# Patient Record
Sex: MALE | Race: WHITE | NOT HISPANIC OR LATINO | URBAN - METROPOLITAN AREA
[De-identification: names, ages, dates, MRNs, and addresses within clinical notes are randomized per-mention and may not be internally consistent; named-entity substitution may affect disease eponyms.]

---

## 2018-03-14 ENCOUNTER — EMERGENCY (EMERGENCY)
Facility: HOSPITAL | Age: 53
LOS: 1 days | Discharge: HOME | End: 2018-03-14
Attending: EMERGENCY MEDICINE | Admitting: EMERGENCY MEDICINE
Payer: COMMERCIAL

## 2018-03-14 VITALS
SYSTOLIC BLOOD PRESSURE: 131 MMHG | RESPIRATION RATE: 18 BRPM | DIASTOLIC BLOOD PRESSURE: 83 MMHG | OXYGEN SATURATION: 98 % | HEART RATE: 67 BPM | TEMPERATURE: 98 F

## 2018-03-14 VITALS
HEART RATE: 56 BPM | OXYGEN SATURATION: 96 % | RESPIRATION RATE: 18 BRPM | DIASTOLIC BLOOD PRESSURE: 69 MMHG | SYSTOLIC BLOOD PRESSURE: 108 MMHG

## 2018-03-14 LAB
ALBUMIN SERPL ELPH-MCNC: 4.9 G/DL — SIGNIFICANT CHANGE UP (ref 3.3–5)
ALP SERPL-CCNC: 58 U/L — SIGNIFICANT CHANGE UP (ref 40–120)
ALT FLD-CCNC: 23 U/L RC — SIGNIFICANT CHANGE UP (ref 10–45)
ANION GAP SERPL CALC-SCNC: 13 MMOL/L — SIGNIFICANT CHANGE UP (ref 5–17)
APTT BLD: 34.7 SEC — SIGNIFICANT CHANGE UP (ref 27.5–37.4)
AST SERPL-CCNC: 20 U/L — SIGNIFICANT CHANGE UP (ref 10–40)
BASE EXCESS BLDV CALC-SCNC: 3.3 MMOL/L — HIGH (ref -2–2)
BASOPHILS # BLD AUTO: 0 K/UL — SIGNIFICANT CHANGE UP (ref 0–0.2)
BASOPHILS NFR BLD AUTO: 0.3 % — SIGNIFICANT CHANGE UP (ref 0–2)
BILIRUB SERPL-MCNC: 0.8 MG/DL — SIGNIFICANT CHANGE UP (ref 0.2–1.2)
BUN SERPL-MCNC: 20 MG/DL — SIGNIFICANT CHANGE UP (ref 7–23)
CA-I SERPL-SCNC: 1.29 MMOL/L — SIGNIFICANT CHANGE UP (ref 1.12–1.3)
CALCIUM SERPL-MCNC: 10.4 MG/DL — SIGNIFICANT CHANGE UP (ref 8.4–10.5)
CHLORIDE BLDV-SCNC: 99 MMOL/L — SIGNIFICANT CHANGE UP (ref 96–108)
CHLORIDE SERPL-SCNC: 98 MMOL/L — SIGNIFICANT CHANGE UP (ref 96–108)
CK MB BLD-MCNC: 1.4 % — SIGNIFICANT CHANGE UP (ref 0–3.5)
CK MB CFR SERPL CALC: 2.2 NG/ML — SIGNIFICANT CHANGE UP (ref 0–6.7)
CK SERPL-CCNC: 160 U/L — SIGNIFICANT CHANGE UP (ref 30–200)
CO2 BLDV-SCNC: 31 MMOL/L — HIGH (ref 22–30)
CO2 SERPL-SCNC: 26 MMOL/L — SIGNIFICANT CHANGE UP (ref 22–31)
CREAT SERPL-MCNC: 0.98 MG/DL — SIGNIFICANT CHANGE UP (ref 0.5–1.3)
EOSINOPHIL # BLD AUTO: 0 K/UL — SIGNIFICANT CHANGE UP (ref 0–0.5)
EOSINOPHIL NFR BLD AUTO: 0.3 % — SIGNIFICANT CHANGE UP (ref 0–6)
GAS PNL BLDV: 138 MMOL/L — SIGNIFICANT CHANGE UP (ref 136–145)
GAS PNL BLDV: SIGNIFICANT CHANGE UP
GLUCOSE BLDV-MCNC: 94 MG/DL — SIGNIFICANT CHANGE UP (ref 70–99)
GLUCOSE SERPL-MCNC: 98 MG/DL — SIGNIFICANT CHANGE UP (ref 70–99)
HCO3 BLDV-SCNC: 29 MMOL/L — SIGNIFICANT CHANGE UP (ref 21–29)
HCT VFR BLD CALC: 45.3 % — SIGNIFICANT CHANGE UP (ref 39–50)
HCT VFR BLDA CALC: 50 % — SIGNIFICANT CHANGE UP (ref 39–50)
HGB BLD CALC-MCNC: 16.4 G/DL — SIGNIFICANT CHANGE UP (ref 13–17)
HGB BLD-MCNC: 16.3 G/DL — SIGNIFICANT CHANGE UP (ref 13–17)
INR BLD: 1.06 RATIO — SIGNIFICANT CHANGE UP (ref 0.88–1.16)
LACTATE BLDV-MCNC: 1.2 MMOL/L — SIGNIFICANT CHANGE UP (ref 0.7–2)
LIDOCAIN IGE QN: 20 U/L — SIGNIFICANT CHANGE UP (ref 7–60)
LYMPHOCYTES # BLD AUTO: 1.9 K/UL — SIGNIFICANT CHANGE UP (ref 1–3.3)
LYMPHOCYTES # BLD AUTO: 14.9 % — SIGNIFICANT CHANGE UP (ref 13–44)
MCHC RBC-ENTMCNC: 33.8 PG — SIGNIFICANT CHANGE UP (ref 27–34)
MCHC RBC-ENTMCNC: 35.9 GM/DL — SIGNIFICANT CHANGE UP (ref 32–36)
MCV RBC AUTO: 94.3 FL — SIGNIFICANT CHANGE UP (ref 80–100)
MONOCYTES # BLD AUTO: 0.6 K/UL — SIGNIFICANT CHANGE UP (ref 0–0.9)
MONOCYTES NFR BLD AUTO: 5 % — SIGNIFICANT CHANGE UP (ref 2–14)
NEUTROPHILS # BLD AUTO: 10.2 K/UL — HIGH (ref 1.8–7.4)
NEUTROPHILS NFR BLD AUTO: 79.6 % — HIGH (ref 43–77)
PCO2 BLDV: 51 MMHG — HIGH (ref 35–50)
PH BLDV: 7.38 — SIGNIFICANT CHANGE UP (ref 7.35–7.45)
PLATELET # BLD AUTO: 235 K/UL — SIGNIFICANT CHANGE UP (ref 150–400)
PO2 BLDV: 32 MMHG — SIGNIFICANT CHANGE UP (ref 25–45)
POTASSIUM BLDV-SCNC: 3.8 MMOL/L — SIGNIFICANT CHANGE UP (ref 3.5–5)
POTASSIUM SERPL-MCNC: 4.1 MMOL/L — SIGNIFICANT CHANGE UP (ref 3.5–5.3)
POTASSIUM SERPL-SCNC: 4.1 MMOL/L — SIGNIFICANT CHANGE UP (ref 3.5–5.3)
PROT SERPL-MCNC: 8 G/DL — SIGNIFICANT CHANGE UP (ref 6–8.3)
PROTHROM AB SERPL-ACNC: 11.6 SEC — SIGNIFICANT CHANGE UP (ref 9.8–12.7)
RBC # BLD: 4.8 M/UL — SIGNIFICANT CHANGE UP (ref 4.2–5.8)
RBC # FLD: 11.3 % — SIGNIFICANT CHANGE UP (ref 10.3–14.5)
SAO2 % BLDV: 58 % — LOW (ref 67–88)
SODIUM SERPL-SCNC: 137 MMOL/L — SIGNIFICANT CHANGE UP (ref 135–145)
TROPONIN T SERPL-MCNC: <0.01 NG/ML — SIGNIFICANT CHANGE UP (ref 0–0.06)
TROPONIN T SERPL-MCNC: <0.01 NG/ML — SIGNIFICANT CHANGE UP (ref 0–0.06)
WBC # BLD: 12.8 K/UL — HIGH (ref 3.8–10.5)
WBC # FLD AUTO: 12.8 K/UL — HIGH (ref 3.8–10.5)

## 2018-03-14 PROCEDURE — 85014 HEMATOCRIT: CPT

## 2018-03-14 PROCEDURE — 96375 TX/PRO/DX INJ NEW DRUG ADDON: CPT

## 2018-03-14 PROCEDURE — 82553 CREATINE MB FRACTION: CPT

## 2018-03-14 PROCEDURE — 99284 EMERGENCY DEPT VISIT MOD MDM: CPT | Mod: 25

## 2018-03-14 PROCEDURE — 83690 ASSAY OF LIPASE: CPT

## 2018-03-14 PROCEDURE — 80053 COMPREHEN METABOLIC PANEL: CPT

## 2018-03-14 PROCEDURE — 71046 X-RAY EXAM CHEST 2 VIEWS: CPT

## 2018-03-14 PROCEDURE — 84132 ASSAY OF SERUM POTASSIUM: CPT

## 2018-03-14 PROCEDURE — 84484 ASSAY OF TROPONIN QUANT: CPT

## 2018-03-14 PROCEDURE — 85610 PROTHROMBIN TIME: CPT

## 2018-03-14 PROCEDURE — 75574 CT ANGIO HRT W/3D IMAGE: CPT

## 2018-03-14 PROCEDURE — 93010 ELECTROCARDIOGRAM REPORT: CPT | Mod: 77

## 2018-03-14 PROCEDURE — 96374 THER/PROPH/DIAG INJ IV PUSH: CPT | Mod: XU

## 2018-03-14 PROCEDURE — 84295 ASSAY OF SERUM SODIUM: CPT

## 2018-03-14 PROCEDURE — 85027 COMPLETE CBC AUTOMATED: CPT

## 2018-03-14 PROCEDURE — 85379 FIBRIN DEGRADATION QUANT: CPT

## 2018-03-14 PROCEDURE — 75574 CT ANGIO HRT W/3D IMAGE: CPT | Mod: 26

## 2018-03-14 PROCEDURE — 82947 ASSAY GLUCOSE BLOOD QUANT: CPT

## 2018-03-14 PROCEDURE — 83605 ASSAY OF LACTIC ACID: CPT

## 2018-03-14 PROCEDURE — 82330 ASSAY OF CALCIUM: CPT

## 2018-03-14 PROCEDURE — 85730 THROMBOPLASTIN TIME PARTIAL: CPT

## 2018-03-14 PROCEDURE — 82803 BLOOD GASES ANY COMBINATION: CPT

## 2018-03-14 PROCEDURE — 82550 ASSAY OF CK (CPK): CPT

## 2018-03-14 PROCEDURE — 93005 ELECTROCARDIOGRAM TRACING: CPT | Mod: XU

## 2018-03-14 PROCEDURE — 82435 ASSAY OF BLOOD CHLORIDE: CPT

## 2018-03-14 PROCEDURE — 71046 X-RAY EXAM CHEST 2 VIEWS: CPT | Mod: 26

## 2018-03-14 PROCEDURE — 93010 ELECTROCARDIOGRAM REPORT: CPT

## 2018-03-14 RX ORDER — NITROGLYCERIN 6.5 MG
0.4 CAPSULE, EXTENDED RELEASE ORAL
Qty: 0 | Refills: 0 | Status: DISCONTINUED | OUTPATIENT
Start: 2018-03-14 | End: 2018-03-18

## 2018-03-14 RX ORDER — ASPIRIN/CALCIUM CARB/MAGNESIUM 324 MG
162 TABLET ORAL ONCE
Qty: 0 | Refills: 0 | Status: COMPLETED | OUTPATIENT
Start: 2018-03-14 | End: 2018-03-14

## 2018-03-14 RX ORDER — ACETAMINOPHEN 500 MG
1000 TABLET ORAL ONCE
Qty: 0 | Refills: 0 | Status: COMPLETED | OUTPATIENT
Start: 2018-03-14 | End: 2018-03-14

## 2018-03-14 RX ORDER — SODIUM CHLORIDE 9 MG/ML
3 INJECTION INTRAMUSCULAR; INTRAVENOUS; SUBCUTANEOUS ONCE
Qty: 0 | Refills: 0 | Status: COMPLETED | OUTPATIENT
Start: 2018-03-14 | End: 2018-03-14

## 2018-03-14 RX ORDER — FAMOTIDINE 10 MG/ML
20 INJECTION INTRAVENOUS ONCE
Qty: 0 | Refills: 0 | Status: COMPLETED | OUTPATIENT
Start: 2018-03-14 | End: 2018-03-14

## 2018-03-14 RX ADMIN — Medication 400 MILLIGRAM(S): at 16:00

## 2018-03-14 RX ADMIN — FAMOTIDINE 20 MILLIGRAM(S): 10 INJECTION INTRAVENOUS at 15:39

## 2018-03-14 RX ADMIN — Medication 0.4 MILLIGRAM(S): at 15:19

## 2018-03-14 RX ADMIN — Medication 162 MILLIGRAM(S): at 15:07

## 2018-03-14 RX ADMIN — SODIUM CHLORIDE 3 MILLILITER(S): 9 INJECTION INTRAMUSCULAR; INTRAVENOUS; SUBCUTANEOUS at 15:07

## 2018-03-14 RX ADMIN — Medication 30 MILLILITER(S): at 15:39

## 2018-03-14 RX ADMIN — Medication 0.4 MILLIGRAM(S): at 15:08

## 2018-03-14 NOTE — ED PROVIDER NOTE - ATTENDING CONTRIBUTION TO CARE
51 y/o m with pmhx HTN, GERD presents for chest pain that began this morning awoke him from sleep. took asa 81 mg today and no relief. pain getting worse. no abdpain no weakness or numbness. Pain in middle of chest and radiates to right side. worse with inspiration. no leg pain or swelling. no recent travel no PE / DVT risk factors.  daughter is a the bedside, works in cardiac unit here in the hospital.  Gen.  no acute resp distress, mod discomfort from pain. worse with inspiration.   HEENT:  Pharynx clear  Lungs:  b/l BS no retractions.  CVS: S1S2   Abd;  soft non tender  Ext: no edema no calf tenderness no skin changes  Neuro: aaox3 no focal deficits.   MSK: 5/5 x 4 ext

## 2018-03-14 NOTE — ED PROVIDER NOTE - PROGRESS NOTE DETAILS
Archie JOYCE: Patient signed out by Dr. Perdomo pending d-dimer, cardiac labs, CXR and dispo. CT coronary showing mild CAD <50% LAD.  Patient instructed to follow up with his PMD and with cardiology.  Will continue ASA 81mgs and statin as previously prescribed.  case d/w Dr. Almanza. -Chino Fiore PA-C Archie JOYCE: Patient was reassessed - patient reports improvement in pain. Results discussed in detail. CT coronary as documented. D-dimer negative. 2nd troponin negative. Patient feels comfortable with outpatient follow up.

## 2018-03-14 NOTE — ED PROVIDER NOTE - OBJECTIVE STATEMENT
51 yo male with PMHx of HTN, HLD p/w chest pain.  The patient reports that he was woken up out of sleep this AM with persistent substernal CP, radiating to the right shoulder and neck.  Pain is made worse with deep inspiration.  No exertional component.  Denies fevers/chills, cough, SOB, abd pain, NVDC, diaphoresis.  Patient is a nonsmoker.  Father with MI in his 50s.  Last stress test was 6-7 yrs ago and normal per patient report.

## 2018-03-14 NOTE — ED ADULT NURSE NOTE - OBJECTIVE STATEMENT
53 yo presents to the ED from home. A&Ox4, ambulatory c/o CP since this morning. pt reports that he woke up with tightness, burning sensation in mid sternal region. 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 pt reports nausea, denies vomiting. pt denies SOB. pt denies fever, chills. pt reports similar episode 10+ years ago, went to ED, negative work up. pt reports history of GERD, denies 53 yo presents to the ED from home. A&Ox4, ambulatory c/o CP since this morning. pt reports that he woke up with tightness, burning sensation in mid sternal region. 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 pt reports nausea, denies vomiting. pt denies SOB. pt denies fever, chills. pt reports similar episode 10+ years ago, went to ED, negative work up. pt reports history of GERD, denies similar feeling, pt reports taking tums earlier today with no relief. pt reports taking 2 81mg aspirin with no relief. pt reports pain is worse when taking a deep breath. pt denies long travel, leg swelling or pain. pt reports family history of heart disease. EKG done in triage. pt placed on CM. pt is well appearing. pt is not diaphoretic. lung sounds clear bilaterally, nonlabored breathing noted upon assessment. pt is afebrile. Patient undressed and placed into gown, call bell in hand and side rails up for safety. warm blanket provided, vital signs stable, pt in no acute distress. family at bedside. MD at bedside for eval.

## 2018-03-14 NOTE — ED PROVIDER NOTE - CARE PLAN
Principal Discharge DX:	Chest pain  Assessment and plan of treatment:	1.  Stay Hydrated  2. Continue taking all current home medications.  Including aspirin and statin      Take Tylenol 650mgs every 4-6 hrs and/or Ibuprofen 600mgs every 6 hrs as needed for pain   3.  Please follow up with your Primary care provider in 1-2 (Please bring all of your results with you)       Follow up with cardiology upon discharge.  Cardiology clinic [phone]  4.  Return to the ER for worsening Chest Pain, Shortness of breath or any other concerning symptoms.

## 2018-03-14 NOTE — ED PROVIDER NOTE - MEDICAL DECISION MAKING DETAILS
ATTG: chest pain / worse with inspiration, concern for cardiac and PE, will check xray check labs, check cardiac work up,check d dimer. re eval for dispo.

## 2018-03-14 NOTE — ED PROVIDER NOTE - PLAN OF CARE
1.  Stay Hydrated  2. Continue taking all current home medications.  Including aspirin and statin      Take Tylenol 650mgs every 4-6 hrs and/or Ibuprofen 600mgs every 6 hrs as needed for pain   3.  Please follow up with your Primary care provider in 1-2 (Please bring all of your results with you)       Follow up with cardiology upon discharge.  Cardiology clinic [phone]  4.  Return to the ER for worsening Chest Pain, Shortness of breath or any other concerning symptoms.

## 2018-03-15 PROBLEM — Z00.00 ENCOUNTER FOR PREVENTIVE HEALTH EXAMINATION: Status: ACTIVE | Noted: 2018-03-15

## 2018-03-16 ENCOUNTER — INPATIENT (INPATIENT)
Facility: HOSPITAL | Age: 53
LOS: 0 days | Discharge: HOME | DRG: 287 | End: 2018-03-16
Attending: INTERNAL MEDICINE | Admitting: INTERNAL MEDICINE
Payer: COMMERCIAL

## 2018-03-16 VITALS
HEART RATE: 62 BPM | HEIGHT: 71 IN | WEIGHT: 225.09 LBS | DIASTOLIC BLOOD PRESSURE: 79 MMHG | OXYGEN SATURATION: 98 % | SYSTOLIC BLOOD PRESSURE: 134 MMHG | RESPIRATION RATE: 18 BRPM

## 2018-03-16 VITALS — HEIGHT: 71 IN | WEIGHT: 225.09 LBS

## 2018-03-16 DIAGNOSIS — R07.9 CHEST PAIN, UNSPECIFIED: ICD-10-CM

## 2018-03-16 PROCEDURE — C1769: CPT

## 2018-03-16 PROCEDURE — 99285 EMERGENCY DEPT VISIT HI MDM: CPT | Mod: 25

## 2018-03-16 PROCEDURE — C1894: CPT

## 2018-03-16 PROCEDURE — C1887: CPT

## 2018-03-16 PROCEDURE — 92928 PRQ TCAT PLMT NTRAC ST 1 LES: CPT | Mod: LC

## 2018-03-16 PROCEDURE — 93005 ELECTROCARDIOGRAM TRACING: CPT

## 2018-03-16 PROCEDURE — 93458 L HRT ARTERY/VENTRICLE ANGIO: CPT

## 2018-03-16 PROCEDURE — 93454 CORONARY ARTERY ANGIO S&I: CPT | Mod: 26,59

## 2018-03-16 PROCEDURE — 99152 MOD SED SAME PHYS/QHP 5/>YRS: CPT

## 2018-03-16 PROCEDURE — 93010 ELECTROCARDIOGRAM REPORT: CPT

## 2018-03-16 RX ORDER — SIMVASTATIN 20 MG/1
1 TABLET, FILM COATED ORAL
Qty: 0 | Refills: 0 | COMMUNITY

## 2018-03-16 RX ORDER — LISINOPRIL 2.5 MG/1
1 TABLET ORAL
Qty: 0 | Refills: 0 | COMMUNITY

## 2018-03-16 RX ORDER — PANTOPRAZOLE SODIUM 20 MG/1
1 TABLET, DELAYED RELEASE ORAL
Qty: 0 | Refills: 0 | COMMUNITY

## 2018-03-16 RX ORDER — OMEPRAZOLE 10 MG/1
1 CAPSULE, DELAYED RELEASE ORAL
Qty: 0 | Refills: 0 | COMMUNITY

## 2018-03-16 RX ORDER — ASPIRIN/CALCIUM CARB/MAGNESIUM 324 MG
0 TABLET ORAL
Qty: 0 | Refills: 0 | COMMUNITY

## 2018-03-16 RX ORDER — ASPIRIN/CALCIUM CARB/MAGNESIUM 324 MG
1 TABLET ORAL
Qty: 0 | Refills: 0 | COMMUNITY

## 2018-03-16 NOTE — ED PROVIDER NOTE - OBJECTIVE STATEMENT
53 yo male with PMHx of HTN, HLD p/w chest pain.  Still on left side since it began approx 2 days ago. was seen here at [place] by me on last visit.  Pain is made worse with deep inspiration.  No exertional component.  Denies fevers/chills, cough, SOB, abd pain, NVDC, diaphoresis.  Patient is a nonsmoker.  Father with MI in his 50s.  Last stress test was 6-7 yrs ago and normal per patient report.

## 2018-03-16 NOTE — ED ADULT NURSE NOTE - OBJECTIVE STATEMENT
53 y/o male with PMHx of HTN, HLD present to ED chest pain. came to for cath lab, Still on left side since it began approx 2 days ago. was seen here at [place] by me on last visit.  Pain is made worse with deep inspiration.  No exertional component.  Denies fevers/chills, cough, SOB, abd pain, N/V/DC, diaphoresis.  Patient is a nonsmoker.  Father with MI in his 50s.  Last stress test was 6-7 yrs ago and normal per patient report. EKG done and given to MD, seen and eval by MD, 18 G heplock placed

## 2018-03-16 NOTE — H&P CARDIOLOGY - HISTORY OF PRESENT ILLNESS
This is a 51 yo  male PMH HTN, HLD, GERD, +FH premature CAD presented to ED 3/14/18 with complaints of "intermittent episodes of chest tightness/pressure radiating to neck and right shoulder with nausea.  Pt. denied dizziness, diaphoresis, palpitations, vomiting, recent weight gain, peripheral edema, or syncope. CT coronaries performed, see results below.  Pt. was discharged home.  Pt. went to work today and began having episode of chest pressure radiating to neck.  Pt. drove to ED.  Pt. presents now to cardiac cath for further evaluation and cardiac cath.     < from: CT Heart with Coronaries (03.14.18 @ 17:01) >  1.  Coronary artery disease.  2.  The calculated Agatston score is 39.  3.  Mild luminal narrowing of the mid left anterior descending coronary   artery and first obtuse marginaldivision (30-50%).  4.  No significant stenosis.      < end of copied text > This is a 51 yo  male PMH HTN, HLD, GERD, +FH premature CAD presented to ED 3/14/18 with complaints of "intermittent episodes of chest tightness/pressure radiating to neck and right shoulder with nausea.  Pt. denied dizziness, diaphoresis, palpitations, vomiting, recent weight gain, peripheral edema, or syncope. CT coronaries performed, see results below.  Pt. was discharged home.  Pt. went to work today and began having episode of chest pressure radiating to neck.  Pt. drove to ED.  Pt. presents now with 8/10 chest pressure for cardiac cath.   PT. WAS NOT EXAMINED PRIOR TO PROCEDURE AS PT. WAS URGENTLY BROUGHT TO LAB.    < from: CT Heart with Coronaries (03.14.18 @ 17:01) >  1.  Coronary artery disease.  2.  The calculated Agatston score is 39.  3.  Mild luminal narrowing of the mid left anterior descending coronary   artery and first obtuse marginaldivision (30-50%).  4.  No significant stenosis.      < end of copied text > This is a 51 yo  male PMH HTN, HLD, GERD, +FH premature CAD presented to ED 3/14/18 with complaints of "intermittent episodes of chest tightness/pressure radiating to neck and right shoulder with nausea.  Pt. denied dizziness, diaphoresis, palpitations, vomiting, recent weight gain, peripheral edema, or syncope. CT coronaries performed, see results below.  Pt. was discharged home.  Pt. went to work today and began having episode of chest pressure radiating to neck.  Pt. drove to ED.  Pt. presents now with 8/10 chest pressure for cardiac cath.    < from: CT Heart with Coronaries (03.14.18 @ 17:01) >  1.  Coronary artery disease.  2.  The calculated Agatston score is 39.  3.  Mild luminal narrowing of the mid left anterior descending coronary   artery and first obtuse marginaldivision (30-50%).  4.  No significant stenosis.      < end of copied text >

## 2018-03-16 NOTE — H&P CARDIOLOGY - FAMILY HISTORY
Father  Still living? Unknown  CAD (coronary artery disease), Age at diagnosis: Age Unknown     Mother  Still living? Unknown  CAD (coronary artery disease), Age at diagnosis: Age Unknown

## 2018-03-16 NOTE — ED PROVIDER NOTE - PHYSICAL EXAMINATION
Gen.  no acute distress   HEENT:  EOMI  Lungs:  ctab/l  CVS: S1S2   Abd;  soft non tend  Ext: no edema  Neuro: aaox3 no focal deficits.   MSK: 5/5 x 4 ext.

## 2018-03-16 NOTE — DISCHARGE NOTE ADULT - PATIENT PORTAL LINK FT
You can access the [facility] Patient Portal, offered by [facility], by registering with the following website: [email or web address]

## 2018-03-16 NOTE — DISCHARGE NOTE ADULT - ADDITIONAL INSTRUCTIONS
Follow up with your cardiologist and primary care provider in 1-2 weeks.   followup with gastroenterologist

## 2018-03-16 NOTE — DISCHARGE NOTE ADULT - CARE PROVIDER_API CALL
Samantha Painting), Cardiovascular Disease; Interventional Cardiology  [address] [place] [id number]  Phone: [phone]  Fax: [phone]

## 2018-03-16 NOTE — DISCHARGE NOTE ADULT - MEDICATION SUMMARY - MEDICATIONS TO TAKE
I will START or STAY ON the medications listed below when I get home from the hospital:    aspirin 81 mg oral tablet  -- 1 tab(s) by mouth once a day  -- Indication: For preventative measure heart disease    lisinopril 20 mg oral tablet  -- 1 tab(s) by mouth once a day  -- Indication: For high blood pressure    simvastatin 20 mg oral tablet  -- 1 tab(s) by mouth once a day (at bedtime)  -- Indication: For elevated cholesterol    Protonix 40 mg oral delayed release tablet  -- 1 tab(s) by mouth once a day  -- Indication: For GERD I will START or STAY ON the medications listed below when I get home from the hospital:    aspirin 81 mg oral tablet  -- 1 tab(s) by mouth once a day  -- Indication: For preventative measure heart disease    lisinopril 20 mg oral tablet  -- 1 tab(s) by mouth once a day  -- Indication: For high blood pressure    simvastatin 20 mg oral tablet  -- 1 tab(s) by mouth once a day (at bedtime)  -- Indication: For elevated cholesterol    omeprazole 20 mg oral delayed release tablet  -- 1 tab(s) by mouth 2 times a day  -- Indication: For GERD

## 2018-03-16 NOTE — ED PROVIDER NOTE - MEDICAL DECISION MAKING DETAILS
ATTG: chest pain concern for MI, case discussed with cardiology, will admit to care of Dr. Henderson for further care.

## 2018-03-16 NOTE — DISCHARGE NOTE ADULT - HOSPITAL COURSE
HPI:  This is a 51 yo  male PMH HTN, HLD, GERD, +FH premature CAD presented to ED 3/14/18 with complaints of "intermittent episodes of chest tightness/pressure radiating to neck and right shoulder with nausea.  Pt. denied dizziness, diaphoresis, palpitations, vomiting, recent weight gain, peripheral edema, or syncope. CT coronaries performed, see results below.  Pt. was discharged home.  Pt. went to work today and began having episode of chest pressure radiating to neck.  Pt. drove to ED.  Pt. presents now with 8/10 chest pressure for cardiac cath.    < from: CT Heart with Coronaries (03.14.18 @ 17:01) >  1.  Coronary artery disease.  2.  The calculated Agatston score is 39.  3.  Mild luminal narrowing of the mid left anterior descending coronary   artery and first obtuse marginaldivision (30-50%).  4.  No significant stenosis.      < end of copied text > (16 Mar 2018 19:11)

## 2018-03-16 NOTE — DISCHARGE NOTE ADULT - CARE PLAN
Principal Discharge DX:	Chest pain  Goal:	Pt. will remain chest pain free  Assessment and plan of treatment:	No heavy lifting,  pushing, pulling with affected arm for 2 weeks.  No strenuous  activity  for 2 weeks. No sex for 1 week.  No driving for 2 days. You may shower 24 hours following procedure but avoid baths and swimming for 1 week. Check wrist site for bleeding and/or swelling daily following procedure. Call your doctor/cardiologist immediately should it occur or if you have increased/persistent pain at the site. Follow up with your cardiologist in 1- 2 weeks. You may call [facility] at [phone] or [phone] after office hours and weekends with any questions or concerns following your procedure.  Secondary Diagnosis:	HLD (hyperlipidemia)  Goal:	LDL<70  Assessment and plan of treatment:	Goal is to keep LDL<70. Continue with your cholesterol medications as prescribed. Eat a heart healthy diet that is low in saturated fats and salt, and includes whole grains, fruits, vegetables and lean protein; exercise regularly (consult with your physician or cardiologist first); maintain a heart healthy weight; if you smoke - quit (A resource to help you stop smoking is the [facility] for Tobacco Control – phone number 915-089-2822.). Continue to follow with your primary physician or cardiologist.  Secondary Diagnosis:	HTN (hypertension)  Goal:	Your blood pressure will be controlled.  Assessment and plan of treatment:	Continue with your blood pressure medications; eat a heart healthy diet with low salt diet; exercise regularly (consult with your physician or cardiologist first); maintain a heart healthy weight; if you smoke - quit (A resource to help you stop smoking is the [facility] for Tobacco [facility] – phone number 941-530-6800.); include healthy ways to manage stress. Continue to follow with your primary care physician or cardiologist.

## 2018-03-16 NOTE — DISCHARGE NOTE ADULT - MEDICATION SUMMARY - MEDICATIONS TO STOP TAKING
I will STOP taking the medications listed below when I get home from the hospital:  None I will STOP taking the medications listed below when I get home from the hospital:    Protonix 40 mg oral delayed release tablet  -- 1 tab(s) by mouth once a day

## 2018-03-16 NOTE — CHART NOTE - NSCHARTNOTEFT_GEN_A_CORE
Pt s/p cardiac cath  R radial band removed by RN Pt began to feel dizzy and bradycardic with HR 34-36 SBP 68  Atropine IVP given and IV Saline bolus given with good results.

## 2018-03-16 NOTE — DISCHARGE NOTE ADULT - PLAN OF CARE
Pt. will remain chest pain free No heavy lifting,  pushing, pulling with affected arm for 2 weeks.  No strenuous  activity  for 2 weeks. No sex for 1 week.  No driving for 2 days. You may shower 24 hours following procedure but avoid baths and swimming for 1 week. Check wrist site for bleeding and/or swelling daily following procedure. Call your doctor/cardiologist immediately should it occur or if you have increased/persistent pain at the site. Follow up with your cardiologist in 1- 2 weeks. You may call [facility] at [phone] or [phone] after office hours and weekends with any questions or concerns following your procedure. LDL<70 Goal is to keep LDL<70. Continue with your cholesterol medications as prescribed. Eat a heart healthy diet that is low in saturated fats and salt, and includes whole grains, fruits, vegetables and lean protein; exercise regularly (consult with your physician or cardiologist first); maintain a heart healthy weight; if you smoke - quit (A resource to help you stop smoking is the [facility] for Tobacco Control – phone number 723-152-1990.). Continue to follow with your primary physician or cardiologist. Your blood pressure will be controlled. Continue with your blood pressure medications; eat a heart healthy diet with low salt diet; exercise regularly (consult with your physician or cardiologist first); maintain a heart healthy weight; if you smoke - quit (A resource to help you stop smoking is the [facility] for Tobacco Control – phone number [phone].); include healthy ways to manage stress. Continue to follow with your primary care physician or cardiologist.

## 2018-03-17 PROBLEM — E78.5 HYPERLIPIDEMIA, UNSPECIFIED: Chronic | Status: ACTIVE | Noted: 2018-03-14

## 2018-03-17 PROBLEM — I10 ESSENTIAL (PRIMARY) HYPERTENSION: Chronic | Status: ACTIVE | Noted: 2018-03-14

## 2018-03-20 ENCOUNTER — APPOINTMENT (OUTPATIENT)
Dept: CARDIOLOGY | Facility: CLINIC | Age: 53
End: 2018-03-20

## 2019-04-19 NOTE — DISCHARGE NOTE ADULT - FINDINGS/TREATMENT
Pt more comfortable appearing on BIPAP.  Tachypnea resolved. Remains on  cc/Hr.  I/O positive %L since admit.  U/O 50cc/hr. CXR this am with low lung vols and crowding of vessels when compared to admit when lungs were better aerated. Antibiotics broadened to Carbopentum.    YARIEL Lal MD     Tele-ICU Service ([facility])   [phone] (T)   nonobstructive CAD

## 2021-10-28 NOTE — ED PROVIDER NOTE - NEURO NEGATIVE STATEMENT, MLM
Dr Alfredo lElis at bedside. Orders for celestone and to continue to monitor. no loss of consciousness, no gait abnormality, no headache, no sensory deficits, and no weakness.

## 2025-06-27 ENCOUNTER — INPATIENT (INPATIENT)
Facility: HOSPITAL | Age: 60
LOS: 0 days | Discharge: HOME | DRG: 322 | End: 2025-06-27
Attending: INTERNAL MEDICINE | Admitting: INTERNAL MEDICINE
Payer: COMMERCIAL

## 2025-06-27 ENCOUNTER — TRANSCRIPTION ENCOUNTER (OUTPATIENT)
Age: 60
End: 2025-06-27

## 2025-06-27 VITALS
DIASTOLIC BLOOD PRESSURE: 92 MMHG | HEIGHT: 68 IN | RESPIRATION RATE: 19 BRPM | WEIGHT: 229.94 LBS | OXYGEN SATURATION: 98 % | TEMPERATURE: 98 F | HEART RATE: 60 BPM | SYSTOLIC BLOOD PRESSURE: 154 MMHG

## 2025-06-27 VITALS
OXYGEN SATURATION: 98 % | TEMPERATURE: 99 F | RESPIRATION RATE: 17 BRPM | HEART RATE: 55 BPM | DIASTOLIC BLOOD PRESSURE: 73 MMHG | SYSTOLIC BLOOD PRESSURE: 125 MMHG

## 2025-06-27 DIAGNOSIS — I20.0 UNSTABLE ANGINA: ICD-10-CM

## 2025-06-27 LAB
ALBUMIN SERPL ELPH-MCNC: 4.6 G/DL — SIGNIFICANT CHANGE UP (ref 3.3–5)
ALP SERPL-CCNC: 55 U/L — SIGNIFICANT CHANGE UP (ref 40–120)
ALT FLD-CCNC: 20 U/L — SIGNIFICANT CHANGE UP (ref 10–45)
ANION GAP SERPL CALC-SCNC: 11 MMOL/L — SIGNIFICANT CHANGE UP (ref 5–17)
APTT BLD: 38.2 SEC — HIGH (ref 26.1–36.8)
AST SERPL-CCNC: 14 U/L — SIGNIFICANT CHANGE UP (ref 10–40)
BASOPHILS # BLD AUTO: 0.04 K/UL — SIGNIFICANT CHANGE UP (ref 0–0.2)
BASOPHILS NFR BLD AUTO: 0.6 % — SIGNIFICANT CHANGE UP (ref 0–2)
BILIRUB SERPL-MCNC: 0.4 MG/DL — SIGNIFICANT CHANGE UP (ref 0.2–1.2)
BUN SERPL-MCNC: 20 MG/DL — SIGNIFICANT CHANGE UP (ref 7–23)
CALCIUM SERPL-MCNC: 9.6 MG/DL — SIGNIFICANT CHANGE UP (ref 8.4–10.5)
CHLORIDE SERPL-SCNC: 105 MMOL/L — SIGNIFICANT CHANGE UP (ref 96–108)
CO2 SERPL-SCNC: 25 MMOL/L — SIGNIFICANT CHANGE UP (ref 22–31)
CREAT SERPL-MCNC: 0.73 MG/DL — SIGNIFICANT CHANGE UP (ref 0.5–1.3)
EGFR: 104 ML/MIN/1.73M2 — SIGNIFICANT CHANGE UP
EGFR: 104 ML/MIN/1.73M2 — SIGNIFICANT CHANGE UP
EOSINOPHIL # BLD AUTO: 0.09 K/UL — SIGNIFICANT CHANGE UP (ref 0–0.5)
EOSINOPHIL NFR BLD AUTO: 1.4 % — SIGNIFICANT CHANGE UP (ref 0–6)
GLUCOSE SERPL-MCNC: 99 MG/DL — SIGNIFICANT CHANGE UP (ref 70–99)
HCT VFR BLD CALC: 44.3 % — SIGNIFICANT CHANGE UP (ref 39–50)
HGB BLD-MCNC: 14.9 G/DL — SIGNIFICANT CHANGE UP (ref 13–17)
IMM GRANULOCYTES # BLD AUTO: 0.02 K/UL — SIGNIFICANT CHANGE UP (ref 0–0.07)
IMM GRANULOCYTES NFR BLD AUTO: 0.3 % — SIGNIFICANT CHANGE UP (ref 0–0.9)
INR BLD: 0.97 RATIO — SIGNIFICANT CHANGE UP (ref 0.85–1.16)
LYMPHOCYTES # BLD AUTO: 1.99 K/UL — SIGNIFICANT CHANGE UP (ref 1–3.3)
LYMPHOCYTES NFR BLD AUTO: 30.2 % — SIGNIFICANT CHANGE UP (ref 13–44)
MAGNESIUM SERPL-MCNC: 2.1 MG/DL — SIGNIFICANT CHANGE UP (ref 1.6–2.6)
MCHC RBC-ENTMCNC: 32 PG — SIGNIFICANT CHANGE UP (ref 27–34)
MCHC RBC-ENTMCNC: 33.6 G/DL — SIGNIFICANT CHANGE UP (ref 32–36)
MCV RBC AUTO: 95.1 FL — SIGNIFICANT CHANGE UP (ref 80–100)
MONOCYTES # BLD AUTO: 0.41 K/UL — SIGNIFICANT CHANGE UP (ref 0–0.9)
MONOCYTES NFR BLD AUTO: 6.2 % — SIGNIFICANT CHANGE UP (ref 2–14)
NEUTROPHILS # BLD AUTO: 4.04 K/UL — SIGNIFICANT CHANGE UP (ref 1.8–7.4)
NEUTROPHILS NFR BLD AUTO: 61.3 % — SIGNIFICANT CHANGE UP (ref 43–77)
NRBC # BLD AUTO: 0 K/UL — SIGNIFICANT CHANGE UP (ref 0–0)
NRBC # FLD: 0 K/UL — SIGNIFICANT CHANGE UP (ref 0–0)
NRBC BLD AUTO-RTO: 0 /100 WBCS — SIGNIFICANT CHANGE UP (ref 0–0)
PLATELET # BLD AUTO: 248 K/UL — SIGNIFICANT CHANGE UP (ref 150–400)
PMV BLD: 9.5 FL — SIGNIFICANT CHANGE UP (ref 7–13)
POTASSIUM SERPL-MCNC: 4.2 MMOL/L — SIGNIFICANT CHANGE UP (ref 3.5–5.3)
POTASSIUM SERPL-SCNC: 4.2 MMOL/L — SIGNIFICANT CHANGE UP (ref 3.5–5.3)
PROT SERPL-MCNC: 7 G/DL — SIGNIFICANT CHANGE UP (ref 6–8.3)
PROTHROM AB SERPL-ACNC: 11.2 SEC — SIGNIFICANT CHANGE UP (ref 9.9–13.4)
RBC # BLD: 4.66 M/UL — SIGNIFICANT CHANGE UP (ref 4.2–5.8)
RBC # FLD: 12.6 % — SIGNIFICANT CHANGE UP (ref 10.3–14.5)
SODIUM SERPL-SCNC: 141 MMOL/L — SIGNIFICANT CHANGE UP (ref 135–145)
TROPONIN T, HIGH SENSITIVITY RESULT: 8 NG/L — SIGNIFICANT CHANGE UP (ref 0–51)
WBC # BLD: 6.59 K/UL — SIGNIFICANT CHANGE UP (ref 3.8–10.5)
WBC # FLD AUTO: 6.59 K/UL — SIGNIFICANT CHANGE UP (ref 3.8–10.5)

## 2025-06-27 PROCEDURE — 99285 EMERGENCY DEPT VISIT HI MDM: CPT | Mod: 25

## 2025-06-27 PROCEDURE — 80053 COMPREHEN METABOLIC PANEL: CPT

## 2025-06-27 PROCEDURE — 84484 ASSAY OF TROPONIN QUANT: CPT

## 2025-06-27 PROCEDURE — 85730 THROMBOPLASTIN TIME PARTIAL: CPT

## 2025-06-27 PROCEDURE — 86850 RBC ANTIBODY SCREEN: CPT

## 2025-06-27 PROCEDURE — 93454 CORONARY ARTERY ANGIO S&I: CPT | Mod: 59

## 2025-06-27 PROCEDURE — 92928 PRQ TCAT PLMT NTRAC ST 1 LES: CPT | Mod: LD

## 2025-06-27 PROCEDURE — C1874: CPT

## 2025-06-27 PROCEDURE — C1894: CPT

## 2025-06-27 PROCEDURE — 93571 IV DOP VEL&/PRESS C FLO 1ST: CPT | Mod: 26,LD

## 2025-06-27 PROCEDURE — 93799 UNLISTED CV SVC/PROCEDURE: CPT | Mod: LD

## 2025-06-27 PROCEDURE — C1769: CPT

## 2025-06-27 PROCEDURE — 71046 X-RAY EXAM CHEST 2 VIEWS: CPT

## 2025-06-27 PROCEDURE — 86900 BLOOD TYPING SEROLOGIC ABO: CPT

## 2025-06-27 PROCEDURE — 85025 COMPLETE CBC W/AUTO DIFF WBC: CPT

## 2025-06-27 PROCEDURE — 99285 EMERGENCY DEPT VISIT HI MDM: CPT

## 2025-06-27 PROCEDURE — 93454 CORONARY ARTERY ANGIO S&I: CPT | Mod: 26,59

## 2025-06-27 PROCEDURE — 83735 ASSAY OF MAGNESIUM: CPT

## 2025-06-27 PROCEDURE — 93005 ELECTROCARDIOGRAM TRACING: CPT

## 2025-06-27 PROCEDURE — C1725: CPT

## 2025-06-27 PROCEDURE — 93010 ELECTROCARDIOGRAM REPORT: CPT | Mod: 77

## 2025-06-27 PROCEDURE — 85610 PROTHROMBIN TIME: CPT

## 2025-06-27 PROCEDURE — 86901 BLOOD TYPING SEROLOGIC RH(D): CPT

## 2025-06-27 PROCEDURE — C1887: CPT

## 2025-06-27 PROCEDURE — 99152 MOD SED SAME PHYS/QHP 5/>YRS: CPT

## 2025-06-27 PROCEDURE — 71046 X-RAY EXAM CHEST 2 VIEWS: CPT | Mod: 26

## 2025-06-27 PROCEDURE — C9600: CPT | Mod: LD

## 2025-06-27 PROCEDURE — 93010 ELECTROCARDIOGRAM REPORT: CPT

## 2025-06-27 RX ORDER — CLOPIDOGREL BISULFATE 75 MG/1
1 TABLET, FILM COATED ORAL
Qty: 90 | Refills: 3
Start: 2025-06-27 | End: 2026-06-21

## 2025-06-27 RX ORDER — TADALAFIL 20 MG/1
1 TABLET, FILM COATED ORAL
Refills: 0 | DISCHARGE

## 2025-06-27 RX ORDER — ASPIRIN 325 MG
243 TABLET ORAL ONCE
Refills: 0 | Status: COMPLETED | OUTPATIENT
Start: 2025-06-27 | End: 2025-06-27

## 2025-06-27 RX ORDER — CLOPIDOGREL BISULFATE 75 MG/1
600 TABLET, FILM COATED ORAL ONCE
Refills: 0 | Status: COMPLETED | OUTPATIENT
Start: 2025-06-27 | End: 2025-06-27

## 2025-06-27 RX ORDER — LISINOPRIL AND HYDROCHLOROTHIAZIDE 12.5; 2 MG/1; MG/1
1 TABLET ORAL
Refills: 0 | DISCHARGE

## 2025-06-27 RX ORDER — EVOLOCUMAB 140 MG/ML
140 INJECTION, SOLUTION SUBCUTANEOUS
Refills: 0 | DISCHARGE

## 2025-06-27 RX ADMIN — Medication 75 MILLILITER(S): at 13:56

## 2025-06-27 RX ADMIN — Medication 75 MILLILITER(S): at 11:34

## 2025-06-27 RX ADMIN — Medication 750 MILLILITER(S): at 11:34

## 2025-06-27 RX ADMIN — CLOPIDOGREL BISULFATE 600 MILLIGRAM(S): 75 TABLET, FILM COATED ORAL at 09:37

## 2025-06-27 RX ADMIN — Medication 243 MILLIGRAM(S): at 09:37

## 2025-06-27 NOTE — DISCHARGE NOTE NURSING/CASE MANAGEMENT/SOCIAL WORK - PATIENT PORTAL LINK FT
You can access the FollowMyHealth Patient Portal offered by [facility] by registering at the following website: [email or web address]. By joining [facility]’s FollowMyHealth portal, you will also be able to view your health information using other applications (apps) compatible with our system.

## 2025-06-27 NOTE — ED ADULT NURSE NOTE - NSICDXFAMILYHX_GEN_ALL_CORE_FT
FAMILY HISTORY:  Father  Still living? Unknown  CAD (coronary artery disease), Age at diagnosis: Age Unknown    Mother  Still living? Unknown  CAD (coronary artery disease), Age at diagnosis: Age Unknown

## 2025-06-27 NOTE — ED PROVIDER NOTE - OBJECTIVE STATEMENT
62-year-old male with past medical history hypertension, hyperlipidemia, GERD, CAD, presents today for 2.5 to 3 weeks worth of intermittent chest pain and palpitations.  Notes that it is always exertional, associated with dyspnea on exertion.  Had recently been on a business trip, evaluated in the [place] the ED with negative tropes and D-dimers however a CT coronary was done with an 80% calcium score and up to 75% stenosis is 1 artery, patient has paperwork at bedside.  Still has angina at baseline with exertional worsening.  Evaluated in this ER with only 20% stenosis in 2018 viewable in medical record.  Otherwise denies fevers, chills, night sweats, abdominal pain, nausea, vomiting. Father with MI in the 50s.  Recently had echo done with his outpatient cardiologist 62-year-old male with past medical history hypertension, hyperlipidemia, GERD, CAD, presents today for 2.5 to 3 weeks worth of intermittent chest pain and palpitations.  Notes that it is always exertional, associated with dyspnea on exertion.  Had recently been on a business trip, evaluated in the [place] the ED with negative tropes and D-dimers however a CT coronary was done with an 80 percetnile calcium score and up to 50-69% stenosis in one artery, patient has paperwork at bedside.  Still has angina at baseline with exertional worsening.  Evaluated in this ER with less stenosis in 2018 viewable in medical record.  Otherwise denies fevers, chills, night sweats, abdominal pain, nausea, vomiting. Father with MI in the 50s.  Recently had echo done with his outpatient cardiologist

## 2025-06-27 NOTE — DISCHARGE NOTE PROVIDER - NSDCMRMEDTOKEN_GEN_ALL_CORE_FT
aspirin 81 mg oral tablet: 1 tab(s) orally once a day  Cardiac Rehab: Cardiac rehab 3x/week x12 weeks post PCI  lisinopril-hydrochlorothiazide 20 mg-12.5 mg oral tablet: 1 tab(s) orally once a day  omeprazole 20 mg oral delayed release tablet: 1 tab(s) orally 2 times a day  Repatha 140 mg/mL subcutaneous solution: 140 milligram(s) subcutaneously every 2 weeks  tadalafil 20 mg oral tablet: 1 tab(s) orally once a day   aspirin 81 mg oral tablet: 1 tab(s) orally once a day  Cardiac Rehab: Cardiac rehab 3x/week x12 weeks post PCI  lisinopril-hydrochlorothiazide 20 mg-12.5 mg oral tablet: 1 tab(s) orally once a day  pantoprazole 40 mg oral delayed release tablet: 1 tab(s) orally once a day  Plavix 75 mg oral tablet: 1 tab(s) orally once a day  Repatha 140 mg/mL subcutaneous solution: 140 milligram(s) subcutaneously every 2 weeks  tadalafil 20 mg oral tablet: 1 tab(s) orally once a day

## 2025-06-27 NOTE — ED PROVIDER NOTE - CARE PLAN
Principal Discharge DX:	Unstable angina pectoris  Secondary Diagnosis:	Abnormal findings on diagnostic imaging of heart and coronary circulation   1

## 2025-06-27 NOTE — H&P CARDIOLOGY - HISTORY OF PRESENT ILLNESS
58 y/o male with PMHx of Reflux, HTN, HLD and non-obstructive CAD (30% LAD, 30% LCx) revealed on [facility] in 2018 and family hx of CAD in 1st degree relative (father had MI in his 50's and multiple stents thereafter) offering c/o 2.5 weeks of exertional chest pain, dyspnea and palpitations resolving with rest.  Patient first experienced aforementioned symptoms while hiking in [place] and was evaluated in the ED where ACS was ruled out.  Patient underwent CTA CORS on 6/25 demonstrating calcium score 285 with overall multivessel obstructive CAD wiht 50-69% stenosis in the pLAD 2/2 low attenuating plaque.  He is now for Cardiac catheterization with possible intervention if indicated.

## 2025-06-27 NOTE — ED PROVIDER NOTE - PHYSICAL EXAMINATION
Sushil Guevara DO (PGY1)   Physical Exam:    Gen: NAD, AOx3  Head: NCAT  HEENT: EOMI, PEERLA, pink and moist mucous membranes  Lung: CTAB, no respiratory distress, no wheezes/rhonchi/rales B/L  CV: RRR, no murmurs, rubs or gallops  Abd: soft, NT, ND, no guarding, no rigidity, no rebound tenderness, no CVA tenderness   MSK: no visible deformities, ROM normal in UE/LE, no back pain  Neuro: No focal sensory or motor deficits. Sensation intact to light touch all extremities.  Skin: Warm, well perfused, no rash, no leg swelling  Psych: normal affect, calm

## 2025-06-27 NOTE — DISCHARGE NOTE PROVIDER - NSDCCPTREATMENT_GEN_ALL_CORE_FT
PRINCIPAL PROCEDURE  Procedure: Left heart catheterization  Findings and Treatment: 6/27/25: OMAR x1 pLAD (70%, iFR +) via RRA

## 2025-06-27 NOTE — ED ADULT NURSE NOTE - NSFALLHARMRISKINTERV_ED_ALL_ED

## 2025-06-27 NOTE — ED PROVIDER NOTE - CLINICAL SUMMARY MEDICAL DECISION MAKING FREE TEXT BOX
Medical Decision Making / Differential Diagnosis:  Intermittent chest pain, dyspnea on exertion with coronary CT that showed 70% LAD stenosis warrants admission for coronary angiogram.    Case d/w cath attg, Dr. Rigoberto Gannon, who will admit for cath.     ECG directly visualized by me and shows sinus bradycardia, rate 57, , , QTc 387, no ST elevations or depressions

## 2025-06-27 NOTE — DISCHARGE NOTE PROVIDER - CARE PROVIDER_API CALL
Rigoberto Khan  Cardiovascular Disease  [address] [place] [address]  Phone: [phone]  Fax: [phone]  Established Patient  Follow Up Time: 2 weeks  
no

## 2025-06-27 NOTE — ED PROVIDER NOTE - ATTENDING CONTRIBUTION TO CARE
Emergency Medicine Attending MD Welch:  patient seen and evaluated with the resident.  I was present for key portions of the History & Physical, and I agree with the Impression & Plan.    Patient is a 59-year-old male with 2-1/2-week history of intermittent chest pain with associated dyspnea on exertion.  Onset of pain was while on vacation in [place], at [place].  States that when he got off the plane he began to experience some chest discomfort and dyspnea which he attributed to the altitude, ~5000 feet.  He went to an urgent care in [place] where an ECG troponin and D-dimer were performed and were negative.  He was told to follow-up with cardiology as an outpatient when he got home, which he did, subsequent troponin all negative again, underwent coronary CT 2 days ago which showed a 50 to 69% stenosis of the LAD and was instructed to come to the ED for cardiac cath.    VS: Blood pressure 154/92, respirations 19, heart rate 60, 97.7, O2 sat 98 % on room air  Gen: Well appearing adult male in NAD  Head: NC/AT  Neck: trachea midline  Resp:  CTA B, no RRW  CV: RRR, no RMG  Abd: nondistended  Ext: no deformities  Neuro:  A&Ox4 appears non focal  Skin:  Warm and dry as visualized  Psych: appropriate    Medical Decision Making / Differential Diagnosis:  Intermittent chest pain, dyspnea on exertion with coronary CT that showed 70% LAD stenosis warrants admission for coronary angiogram.    Case d/w cath attg, Dr. Rigoberto Gannon, who will admit for cath.     ECG directly visualized by me and shows sinus bradycardia, rate 57, , , QTc 387, no ST elevations or depressions

## 2025-06-27 NOTE — PATIENT PROFILE ADULT - FUNCTIONAL ASSESSMENT - BASIC MOBILITY 1.
PRE-OP DIAGNOSIS:  Trigger thumb, left thumb 18-Apr-2025 15:42:28  Linda Anderson  Trigger finger, left middle finger 18-Apr-2025 15:42:34  Linda Anderson  
4 = No assist / stand by assistance

## 2025-06-27 NOTE — ED ADULT NURSE NOTE - OBJECTIVE STATEMENT
Patient  is  alert  and  oriented  x4. Color  is  good and skin warm  to touch. He is  c/o chest pain  x 10 days and dyspnea on exertion. No  diaphoresis noted.

## 2025-06-27 NOTE — DISCHARGE NOTE PROVIDER - NSDCFUADDINST_GEN_ALL_CORE_FT
Wound Care:   the day AFTER your procedure remove bandage GENTLY, and clean using  mild soap and gentle warm, water stream, pat dry. leave OPEN to air. YOU MAY SHOWER   DO NOT apply lotions, creams, ointments, powder, perfumes to your incision site  DO NOT SOAK your site for 1 week ( no baths, no pools, no tubs, etc...)  Check  your groin and/or wrist daily. A small amount of bruising, and soreness are normal    ACTIVITY: for 24 hours   - DO NOT DRIVE  - DO NOT make any important decisions or sign legal documents   - DO NOT operate heavy machineries   - you may resume sexual activity in 48 hours, unless otherwise instructed by your cardiologist     If your procedure was done through the WRIST: for the NEXT 3 DAYS  - avoid pushing, pulling, with that affected wrist   - avoid repeated movement of that hand and wrist ( eg: typing, hammering)  - DO NOT LIFT anything more than 5 lbs     If your procedure was done through the GROIN: for the NEXT 5 DAYS  - Limit climbing stairs, DO NOT soak in bathtub or pool  - no strenuous activities, pushing, pulling, straining  - Do not lift anything 10lbs or heavier     MEDICATION:   take your medications as explained ( see discharge paperwork)   If you received a STENT, you will be taking antiplatelet medications to KEEP YOUR STENT OPEN ( eg: Aspirin, Plavix, Brilinta, Effient, etc).  Take as prescribed DO NOT STOP taking them without consulting with your cardiologist first.     Follow heart healthy diet recommended by your doctor, , if you smoke STOP SMOKING ( may call [phone] for center of tobacco control if you need assistance)     CALL your doctor to make appointment in 2 WEEKS     ***CALL YOUR DOCTOR***  if you experience: fever, chills, body aches, or severe pain, swelling, redness, heat or yellow discharge at incision site  If you experience Bleeding or excruciating pain at the procedural site, swelling (golf ball size) at your procedural site  If you experience CHEST PAIN  If you experience extremity numbness, tingling, temperature change (of your procedural site)   If you are unable to reach your doctor, you may contact:   -Cardiology Office at [facility] at [phone] or   - [facility] [phone]  - [facility] [phone]

## 2025-06-27 NOTE — DISCHARGE NOTE NURSING/CASE MANAGEMENT/SOCIAL WORK - FINANCIAL ASSISTANCE
[facility] provides services at a reduced cost to those who are determined to be eligible through [facility]’s financial assistance program. Information regarding [facility]’s financial assistance program can be found by going to [email or web address] or by calling [phone].

## 2025-06-27 NOTE — DISCHARGE NOTE PROVIDER - HOSPITAL COURSE
HPI:  60 y/o male with PMHx of Reflux, HTN, HLD and non-obstructive CAD (30% LAD, 30% LCx) revealed on [facility] in 2018 and family hx of CAD in 1st degree relative (father had MI in his 50's and multiple stents thereafter) offering c/o 2.5 weeks of exertional chest pain, dyspnea and palpitations resolving with rest.  Patient first experienced aforementioned symptoms while hiking in [place] and was evaluated in the ED where ACS was ruled out.  Patient underwent CTA CORS on 6/25 demonstrating calcium score 285 with overall multivessel obstructive CAD wiht 50-69% stenosis in the pLAD 2/2 low attenuating plaque.  He is now for Cardiac catheterization with possible intervention if indicated.   (27 Jun 2025 10:54)    Hospital course:  6/27: s/p cardiac cath: OMAR x1 pLAD (iFR +, 70%) via RRA    __ year old ____ w/ PMHx of ____ presented to outpatient cardiologist c/o class II-III anginal symptoms, now referred to [facility] for cardiac catheterization. Now s/p cardiac catheterization (date)____ revealing ____. Post-procedure, radial band/femoral___ sheath removed per protocol w/o any complications; site stable -- soft, nontender, no hematoma. Continue _____ (ASA 81mg qd, Plavix 75mg qd, Atorvastatin 40mg qhs). Denies any complaints at this time. Patient remained hemodynamically stable, neurovascularly intact and chest pain free post-procedure. Remained overnight for observation and pain control. Patient has been medically cleared for discharge as per  ____. Patient has been given appropriate discharge instructions including medication regimen, access site management and follow up. Medications that patient needs refills on (+/- new medications) have been e-prescribed to preferred pharmacy. Patient will f/u with  ____ in 1-2 weeks for further management.     VSS  Gen: NAD, A&O x3  Cards: RRR, clear S1 and S2 without murmur  Pulm: CTA B/L without w/r/r  Vascular: Right ___ w/o hematoma, ooze or bruit. Peripheral pulses 2+ B/L  Abd: soft, NT  Ext: no LE edema or ulcerations B/L    Cardiac Rehab (STEMI/NSTEMI/ACS/Unstable Angina/CHF/Chronic Stable Angina/Heart Surgery (CABG,Valve)/Post PCI):              *Education on benefits of Cardiac Rehab provided to patient: Yes         *Referral and Prescription Given for Cardiac Rehab : Yes         *Pt given list of locations & instructed to contact their insurance company to review list of participating providers         *Pt instructed to bring Cardiac Rehab prescription with them to Cardiology Follow up appointment for assistance with enrollment: Yes         *Pt discharged with copies detail cardiovascular history, medications, testing/treatments         *Reasons for NO Cardiac rehab referral rx:     HPI:  58 y/o male with PMHx of Reflux, HTN, HLD and non-obstructive CAD (30% LAD, 30% LCx) revealed on [facility] in 2018 and family hx of CAD in 1st degree relative (father had MI in his 50's and multiple stents thereafter) offering c/o 2.5 weeks of exertional chest pain, dyspnea and palpitations resolving with rest.  Patient first experienced aforementioned symptoms while hiking in [place] and was evaluated in the ED where ACS was ruled out.  Patient underwent CTA CORS on 6/25 demonstrating calcium score 285 with overall multivessel obstructive CAD wiht 50-69% stenosis in the pLAD 2/2 low attenuating plaque.  He is now for Cardiac catheterization with possible intervention if indicated.   (27 Jun 2025 10:54)    Hospital course:  6/27: s/p cardiac cath: OMAR x1 pLAD (iFR +, 70%) via RRA    Post-procedure, radial band removed per protocol w/o any complications; site stable -- soft, nontender, no hematoma. Continue ASA 81mg qd, Plavix 75mg qd. Statin intolerant, c/w Repatha. Denies any complaints at this time. Patient remained hemodynamically stable, neurovascularly intact and chest pain free post-procedure. Remained overnight for observation and pain control. Patient has been medically cleared for discharge as per Dr. Khan. Patient has been given appropriate discharge instructions including medication regimen, access site management and follow up. Medications that patient needs refills on (+/- new medications) have been e-prescribed to preferred pharmacy. Patient will f/u with Dr. Khan in 1-2 weeks for further management.     VSS  Gen: NAD, A&O x3  Cards: RRR, clear S1 and S2 without murmur  Pulm: CTA B/L without w/r/r  Vascular: Right wrist w/o hematoma, ooze or bruit. Peripheral pulses 2+ B/L  Abd: soft, NT  Ext: no LE edema or ulcerations B/L    Cardiac Rehab (STEMI/NSTEMI/ACS/Unstable Angina/CHF/Chronic Stable Angina/Heart Surgery (CABG,Valve)/Post PCI):              *Education on benefits of Cardiac Rehab provided to patient: Yes         *Referral and Prescription Given for Cardiac Rehab : Yes         *Pt given list of locations & instructed to contact their insurance company to review list of participating providers         *Pt instructed to bring Cardiac Rehab prescription with them to Cardiology Follow up appointment for assistance with enrollment: Yes         *Pt discharged with copies detail cardiovascular history, medications, testing/treatments         *Reasons for NO Cardiac rehab referral rx:     HPI:  58 y/o male with PMHx of Reflux, HTN, HLD and non-obstructive CAD (30% LAD, 30% LCx) revealed on [facility] in 2018 and family hx of CAD in 1st degree relative (father had MI in his 50's and multiple stents thereafter) offering c/o 2.5 weeks of exertional chest pain, dyspnea and palpitations resolving with rest.  Patient first experienced aforementioned symptoms while hiking in [place] and was evaluated in the ED where ACS was ruled out.  Patient underwent CTA CORS on 6/25 demonstrating calcium score 285 with overall multivessel obstructive CAD wiht 50-69% stenosis in the pLAD 2/2 low attenuating plaque.  He is now for Cardiac catheterization with possible intervention if indicated.   (27 Jun 2025 10:54)    Hospital course:  6/27: s/p cardiac cath: OMAR x1 pLAD (iFR +, 70%) via RRA    Post-procedure, radial band removed per protocol w/o any complications; site stable -- soft, nontender, no hematoma. Continue ASA 81mg qd, Plavix 75mg qd. Statin intolerant, c/w Repatha. Denies any complaints at this time. Patient remained hemodynamically stable, neurovascularly intact and chest pain free post-procedure. Patient has been medically cleared for discharge as per Dr. Khan. Patient has been given appropriate discharge instructions including medication regimen, access site management and follow up. Medications that patient needs refills on (+/- new medications) have been e-prescribed to preferred pharmacy. Patient will f/u with Dr. Khan in 1-2 weeks for further management.     VSS  Gen: NAD, A&O x3  Cards: RRR, clear S1 and S2 without murmur  Pulm: CTA B/L without w/r/r  Vascular: Right wrist w/o hematoma, ooze or bruit. Peripheral pulses 2+ B/L  Abd: soft, NT  Ext: no LE edema or ulcerations B/L    Cardiac Rehab (STEMI/NSTEMI/ACS/Unstable Angina/CHF/Chronic Stable Angina/Heart Surgery (CABG,Valve)/Post PCI):              *Education on benefits of Cardiac Rehab provided to patient: Yes         *Referral and Prescription Given for Cardiac Rehab : Yes         *Pt given list of locations & instructed to contact their insurance company to review list of participating providers         *Pt instructed to bring Cardiac Rehab prescription with them to Cardiology Follow up appointment for assistance with enrollment: Yes         *Pt discharged with copies detail cardiovascular history, medications, testing/treatments         *Reasons for NO Cardiac rehab referral rx:

## 2025-07-01 PROBLEM — I25.10 ATHEROSCLEROTIC HEART DISEASE OF NATIVE CORONARY ARTERY WITHOUT ANGINA PECTORIS: Chronic | Status: ACTIVE | Noted: 2025-06-27

## 2025-07-09 RX ORDER — EVOLOCUMAB 140 MG/ML
140 INJECTION, SOLUTION SUBCUTANEOUS
Refills: 0 | Status: ACTIVE | COMMUNITY

## 2025-07-09 RX ORDER — ASPIRIN 81 MG/1
81 TABLET, DELAYED RELEASE ORAL
Refills: 0 | Status: ACTIVE | COMMUNITY

## 2025-07-09 RX ORDER — LISINOPRIL AND HYDROCHLOROTHIAZIDE TABLETS 20; 12.5 MG/1; MG/1
20-12.5 TABLET ORAL DAILY
Refills: 0 | Status: ACTIVE | COMMUNITY

## 2025-07-12 ENCOUNTER — NON-APPOINTMENT (OUTPATIENT)
Age: 60
End: 2025-07-12

## 2025-07-14 ENCOUNTER — APPOINTMENT (OUTPATIENT)
Dept: CARDIOLOGY | Facility: CLINIC | Age: 60
End: 2025-07-14

## 2025-07-14 VITALS
BODY MASS INDEX: 32.2 KG/M2 | SYSTOLIC BLOOD PRESSURE: 126 MMHG | OXYGEN SATURATION: 98 % | HEART RATE: 60 BPM | HEIGHT: 71 IN | DIASTOLIC BLOOD PRESSURE: 74 MMHG | WEIGHT: 230 LBS

## 2025-07-14 PROCEDURE — 93000 ELECTROCARDIOGRAM COMPLETE: CPT

## 2025-07-14 PROCEDURE — 99215 OFFICE O/P EST HI 40 MIN: CPT | Mod: 25

## 2025-07-14 RX ORDER — PANTOPRAZOLE 40 MG/1
40 TABLET, DELAYED RELEASE ORAL
Qty: 90 | Refills: 1 | Status: ACTIVE | COMMUNITY
Start: 1900-01-01 | End: 1900-01-01

## 2025-07-14 RX ORDER — TADALAFIL 5 MG/1
5 TABLET ORAL DAILY
Refills: 0 | Status: ACTIVE | COMMUNITY

## 2025-07-14 RX ORDER — UBIDECARENONE 100 MG
100 CAPSULE ORAL
Refills: 0 | Status: ACTIVE | COMMUNITY
Start: 2025-07-14

## 2025-07-14 RX ORDER — CLOPIDOGREL BISULFATE 75 MG/1
75 TABLET, FILM COATED ORAL DAILY
Qty: 1 | Refills: 1 | Status: ACTIVE | COMMUNITY
Start: 1900-01-01 | End: 1900-01-01

## 2025-07-30 NOTE — DISCHARGE NOTE ADULT - BECAUSE OF A PHYSICAL, MENTAL OR EMOTIONAL CONDITION, DO YOU HAVE DIFFICULTY DOING  [place] ALONE LIKE VISITING A DOCTOR'S OFFICE OR SHOPPING (15 YEARS AND OLDER)
LOV: 7/7/25    Please advise on referral request.  
Order placed.  
Unable to reach patient by phone due to phone issues in the clinic  
No